# Patient Record
Sex: FEMALE | Race: WHITE | NOT HISPANIC OR LATINO | URBAN - NONMETROPOLITAN AREA
[De-identification: names, ages, dates, MRNs, and addresses within clinical notes are randomized per-mention and may not be internally consistent; named-entity substitution may affect disease eponyms.]

---

## 2020-08-27 ENCOUNTER — OFFICE (OUTPATIENT)
Dept: URBAN - NONMETROPOLITAN AREA CLINIC 15 | Facility: CLINIC | Age: 85
End: 2020-08-27
Payer: COMMERCIAL

## 2020-08-27 VITALS
WEIGHT: 131 LBS | TEMPERATURE: 98.8 F | HEIGHT: 60 IN | DIASTOLIC BLOOD PRESSURE: 66 MMHG | HEART RATE: 63 BPM | RESPIRATION RATE: 20 BRPM | SYSTOLIC BLOOD PRESSURE: 171 MMHG

## 2020-08-27 DIAGNOSIS — K51.90 ULCERATIVE COLITIS, UNSPECIFIED, WITHOUT COMPLICATIONS: ICD-10-CM

## 2020-08-27 PROCEDURE — 99212 OFFICE O/P EST SF 10 MIN: CPT | Performed by: INTERNAL MEDICINE

## 2021-08-26 ENCOUNTER — OFFICE (OUTPATIENT)
Dept: URBAN - NONMETROPOLITAN AREA CLINIC 5 | Facility: CLINIC | Age: 86
End: 2021-08-26

## 2021-08-26 VITALS
WEIGHT: 128 LBS | SYSTOLIC BLOOD PRESSURE: 141 MMHG | HEART RATE: 82 BPM | HEIGHT: 60 IN | RESPIRATION RATE: 20 BRPM | DIASTOLIC BLOOD PRESSURE: 68 MMHG

## 2021-08-26 DIAGNOSIS — K51.90 ULCERATIVE COLITIS, UNSPECIFIED, WITHOUT COMPLICATIONS: ICD-10-CM

## 2021-08-26 PROCEDURE — 99213 OFFICE O/P EST LOW 20 MIN: CPT | Performed by: INTERNAL MEDICINE

## 2021-08-26 RX ORDER — MESALAMINE 400 MG/1
2400 CAPSULE, DELAYED RELEASE ORAL
Qty: 520 | Refills: 4 | Status: ACTIVE

## 2024-07-02 ENCOUNTER — OFFICE (OUTPATIENT)
Dept: URBAN - NONMETROPOLITAN AREA CLINIC 5 | Facility: CLINIC | Age: 89
End: 2024-07-02

## 2024-07-02 VITALS
WEIGHT: 125 LBS | HEIGHT: 60 IN | DIASTOLIC BLOOD PRESSURE: 59 MMHG | RESPIRATION RATE: 20 BRPM | HEART RATE: 62 BPM | SYSTOLIC BLOOD PRESSURE: 157 MMHG

## 2024-07-02 DIAGNOSIS — K51.90 ULCERATIVE COLITIS, UNSPECIFIED, WITHOUT COMPLICATIONS: ICD-10-CM

## 2024-07-02 DIAGNOSIS — R10.31 RIGHT LOWER QUADRANT PAIN: ICD-10-CM

## 2024-07-02 DIAGNOSIS — K92.1 MELENA: ICD-10-CM

## 2024-07-02 DIAGNOSIS — R10.32 LEFT LOWER QUADRANT PAIN: ICD-10-CM

## 2024-07-02 PROCEDURE — 99214 OFFICE O/P EST MOD 30 MIN: CPT | Performed by: INTERNAL MEDICINE

## 2024-07-02 NOTE — SERVICEHPINOTES
Magaly Perez   is a   89   year old  female   who is here today for routine follow up. the patient is a very nice 89-year-old white female with a longstanding history of ulcerative colitis, GERD, chronic atrial fibrillation, hypertension, who comes in to see me today referred for recent symptoms of increased frequency stools and some hematochezia.  Patient states that she was doing well until about 2 weeks ago when she developed some soreness in her lower abdomen and  subsequently saw some red blood in the stool on 4 separate occasions.  This stopped last week and she has not seen any more since that time.  She does also state that her stools have been a bit looser although she is having only 1 bowel movement a day.  She had some lower abdominal crampy discomfort last week and vomited once but states these symptoms have improved.  She has been under increased stress at home as  1 of her daughters  approximately 2 weeks ago   After an extended illness with cancer.  Her recent workup includes lab on 2024 revealing a WBC of 9.1, hematocrit 32, MCV 94, platelet count of 169 K. creatinine normal at 1.1 with normal liver enzymes.  CT scan of the abdomen and pelvis on 2024 revealed colonic diverticulosis and mild wall thickening in the rectum, suspicious for colitis.  There was mild thickening of the urinary bladder wall as well.  An 11 mm gallstone is also seen.   She apparently did see Dr. Min back on 24 and by the family's report she received a steroid injection. She is still living at home alone but has family that checks on her regularly and her daughter is with her today.

## 2024-07-02 NOTE — SERVICENOTES
Her recent symptoms may be secondary to a gastrointestinal viral or bacterial syndrome which may have exacerbated her UC.  This also may represent a mild flare of her UC.  Symptomatically, she appears to be improving at this time.  If she continues to improve we may need to do nothing further, but if she has more symptoms that worsen, we may need to do a  nonsedated limited flex-sig.  Clearly she is at significant risk for any type of procedure.

## 2024-08-08 ENCOUNTER — OFFICE (OUTPATIENT)
Dept: URBAN - NONMETROPOLITAN AREA CLINIC 5 | Facility: CLINIC | Age: 89
End: 2024-08-08
Payer: MEDICARE

## 2024-08-08 VITALS
WEIGHT: 122 LBS | DIASTOLIC BLOOD PRESSURE: 71 MMHG | HEART RATE: 67 BPM | RESPIRATION RATE: 20 BRPM | HEIGHT: 60 IN | SYSTOLIC BLOOD PRESSURE: 147 MMHG

## 2024-08-08 DIAGNOSIS — K21.9 GASTRO-ESOPHAGEAL REFLUX DISEASE WITHOUT ESOPHAGITIS: ICD-10-CM

## 2024-08-08 DIAGNOSIS — K51.90 ULCERATIVE COLITIS, UNSPECIFIED, WITHOUT COMPLICATIONS: ICD-10-CM

## 2024-08-08 PROCEDURE — 99213 OFFICE O/P EST LOW 20 MIN: CPT | Performed by: INTERNAL MEDICINE
